# Patient Record
Sex: FEMALE | Race: BLACK OR AFRICAN AMERICAN | NOT HISPANIC OR LATINO
[De-identification: names, ages, dates, MRNs, and addresses within clinical notes are randomized per-mention and may not be internally consistent; named-entity substitution may affect disease eponyms.]

---

## 2021-08-12 PROBLEM — Z00.00 ENCOUNTER FOR PREVENTIVE HEALTH EXAMINATION: Status: ACTIVE | Noted: 2021-08-12

## 2021-09-22 ENCOUNTER — APPOINTMENT (OUTPATIENT)
Dept: NEUROLOGY | Facility: CLINIC | Age: 61
End: 2021-09-22
Payer: COMMERCIAL

## 2021-09-22 ENCOUNTER — TRANSCRIPTION ENCOUNTER (OUTPATIENT)
Age: 61
End: 2021-09-22

## 2021-09-22 VITALS
OXYGEN SATURATION: 97 % | WEIGHT: 195 LBS | HEIGHT: 65 IN | HEART RATE: 78 BPM | BODY MASS INDEX: 32.49 KG/M2 | DIASTOLIC BLOOD PRESSURE: 86 MMHG | SYSTOLIC BLOOD PRESSURE: 130 MMHG

## 2021-09-22 DIAGNOSIS — Z78.9 OTHER SPECIFIED HEALTH STATUS: ICD-10-CM

## 2021-09-22 DIAGNOSIS — R51.9 HEADACHE, UNSPECIFIED: ICD-10-CM

## 2021-09-22 DIAGNOSIS — G47.30 SLEEP APNEA, UNSPECIFIED: ICD-10-CM

## 2021-09-22 DIAGNOSIS — E11.9 TYPE 2 DIABETES MELLITUS W/OUT COMPLICATIONS: ICD-10-CM

## 2021-09-22 PROCEDURE — 99244 OFF/OP CNSLTJ NEW/EST MOD 40: CPT

## 2021-09-22 RX ORDER — EMPAGLIFLOZIN 10 MG/1
10 TABLET, FILM COATED ORAL
Refills: 0 | Status: ACTIVE | COMMUNITY

## 2021-09-22 NOTE — HISTORY OF PRESENT ILLNESS
[FreeTextEntry1] : Headache\par Age of onset: age 60 \par \par How long have they been present? one year\par \par Description\par Headaches are bifrontal- typically wakes up with it. Not every day but 4 times/week- does not wake her up in the middle of the night- Headache goes away within 2 hours\par Quality- pulsating, throbbing, stabbing \par Nausea or vomiting- NO\par Light or sound sensitivity- NO\par Warning/aura (premonitory phase)- NO\par Post-drome: cognitive slowing, fatigue- NONE\par \par Associated with exertion or Valsalva- NO\par \par Duration\par Average number per week- 4 times/week\par \par Has reached menopause\par History of trauma- NO\par \par Triggers\par None - waking up with it.\par \par How well do you sleep? 6 hours- Not sure if she snores- wakes up not feeling rested- urinate once at night- \par \par How much water do you drink?- 1.5 L\par \par Caffeine intake- yes 2 cups in the morning- none in afternoon or evening\par \par Family History of headaches- Yes - has a cousin (tumor)\par

## 2021-09-22 NOTE — ASSESSMENT
[FreeTextEntry1] : Neurologic examination is normal. Patient is waking up with headaches- these are very non-specific and uncomfortable and occurring in an increasing number. She needs imaging as she has no previous history of headaches.\par \par Additionally she needs a home sleep study to evaluate for possible sleep apnea (fatigue, dry mouth, nocturia).

## 2021-10-26 ENCOUNTER — NON-APPOINTMENT (OUTPATIENT)
Age: 61
End: 2021-10-26

## 2021-12-10 ENCOUNTER — APPOINTMENT (OUTPATIENT)
Dept: NEUROLOGY | Facility: CLINIC | Age: 61
End: 2021-12-10
Payer: COMMERCIAL

## 2021-12-10 PROCEDURE — 95806 SLEEP STUDY UNATT&RESP EFFT: CPT

## 2021-12-13 ENCOUNTER — NON-APPOINTMENT (OUTPATIENT)
Age: 61
End: 2021-12-13

## 2021-12-13 ENCOUNTER — APPOINTMENT (OUTPATIENT)
Dept: NEUROLOGY | Facility: CLINIC | Age: 61
End: 2021-12-13

## 2025-09-02 ENCOUNTER — APPOINTMENT (OUTPATIENT)
Facility: CLINIC | Age: 65
End: 2025-09-02